# Patient Record
(demographics unavailable — no encounter records)

---

## 2024-10-11 NOTE — HISTORY OF PRESENT ILLNESS
[Sudden] : sudden [1] : 2 [10] : 10 [] : Post Surgical Visit: yes [de-identified] : dos: 07.15.24 10/11/24: Here for pov s/p left knee acl, mmr, plm. Attending PT. Doing well.  8/27/24: Here for f/u pov4 s/p left knee ACL reconstruction, medial meniscal repair and partial lateral meniscectomy. Doing well. Attending PT.  8/9/24: Pt here for f/u  pov3 s/p left knee ACL reconstruction, medial meniscal repair and partial lateral meniscectomy. Doing well. attending PT, compliant with brace. 07/26/2024: Pt here for pov2 s/p left knee ACL reconstruction, medial meniscal repair and partial lateral meniscectomy. Doing well. Complaint with brace.  07/16/2024: Pt here for pov1 s/p left knee ACL reconstruction, medial meniscal repair and partial lateral meniscectomy. Doing well. Denies f/c/s. Complaint with Brace. Ambulating with crutches.   7/11/24: Here to f/up left knee and review MRI results.  7/10/24: Mr ELLIOT PATEL is a 23 y/o male here today for L knee pain. States last night he was playing volleyball, he jumped and when he landed, he heard a pop and his knee buckled/shifted and had sudden onset of pain. States he has full ROM and not too much pain now. Presents with edema, and points to pain poster lateal knee, and medial knee. States he tried to walk this morning but felt like his knee was going to give out. He presents in the office today NWB in a wheelchair and has a compression sock on. No tx or images have been done. Occ: [employer] [FreeTextEntry3] : 7/9/24 [FreeTextEntry4] : 7pm [FreeTextEntry5] : PO#1- LT knee ACL recon. with meniscal repair, DOS 7/15/24. Continuing PT- going well, noticing improvement. No longer using brace for one week, wearing compression sleeve to help with swelling.  [de-identified] : 7/15/24 [de-identified] : 7/15/24 [de-identified] : LT knee ACL recon. with meniscal repair

## 2024-10-11 NOTE — PHYSICAL EXAM
[Left] : left knee [NL (0)] : extension 0 degrees [NL (140)] : flexion 140 degrees [] : no pain with varus stress

## 2024-10-11 NOTE — REASON FOR VISIT
[FreeTextEntry2] : LT knee ACL recon. with medial meniscal repair and partial lateral meniscectomy, DOS 7/15/24, patient is feeling better since last visit and is doing PT 2x a week

## 2024-10-11 NOTE — DISCUSSION/SUMMARY
[de-identified] : 24m s/p left knee acl recon, mmr and plm 07.15.24 1) ACL functional Brace was appropriately fitted and dispensed. Pt left the office with brace as stated above.   2) reviewed post-op precautions and procedures. 3) continue with physical therapy 4) cryotherapy, rest and activity modification  5) rtc 3 months   Entered by Kezia Agrawal acting as scribe. Dr. Parrish- The documentation recorded by the scribe accurately reflects the service I personally performed and the decisions made by me.

## 2024-12-09 NOTE — PHYSICAL EXAM
[Left] : left knee [NL (140)] : flexion 140 degrees [NL (0)] : extension 0 degrees [Negative] : negative Cathy's [] : non-antalgic

## 2024-12-09 NOTE — HISTORY OF PRESENT ILLNESS
[Sudden] : sudden [1] : 2 [10] : 10 [] : Post Surgical Visit: yes [de-identified] : dos: 07.15.24  12/9/24: Pt here to f/u L knee ACLR. States he is attending PT 2x/week, and just started some light jogging and jumping. mild pain / stiffness after increased activity.  10/11/24: Here for pov s/p left knee acl, mmr, plm. Attending PT. Doing well.  8/27/24: Here for f/u pov4 s/p left knee ACL reconstruction, medial meniscal repair and partial lateral meniscectomy. Doing well. Attending PT.  8/9/24: Pt here for f/u  pov3 s/p left knee ACL reconstruction, medial meniscal repair and partial lateral meniscectomy. Doing well. attending PT, compliant with brace. 07/26/2024: Pt here for pov2 s/p left knee ACL reconstruction, medial meniscal repair and partial lateral meniscectomy. Doing well. Complaint with brace.  07/16/2024: Pt here for pov1 s/p left knee ACL reconstruction, medial meniscal repair and partial lateral meniscectomy. Doing well. Denies f/c/s. Complaint with Brace. Ambulating with crutches.   7/11/24: Here to f/up left knee and review MRI results.  7/10/24: Mr ELLIOT PATEL is a 23 y/o male here today for L knee pain. States last night he was playing volleyball, he jumped and when he landed, he heard a pop and his knee buckled/shifted and had sudden onset of pain. States he has full ROM and not too much pain now. Presents with edema, and points to pain poster lateal knee, and medial knee. States he tried to walk this morning but felt like his knee was going to give out. He presents in the office today NWB in a wheelchair and has a compression sock on. No tx or images have been done. Occ: [employer] [FreeTextEntry3] : 7/9/24 [FreeTextEntry4] : 7pm [FreeTextEntry5] : PO#1- LT knee ACL recon. with meniscal repair, DOS 7/15/24. Continuing PT- going well, noticing improvement. No longer using brace for one week, wearing compression sleeve to help with swelling.  [de-identified] : 7/15/24 [de-identified] : 7/15/24 [de-identified] : LT knee ACL recon. with meniscal repair

## 2024-12-09 NOTE — DISCUSSION/SUMMARY
[de-identified] : 24m s/p left knee acl recon, mmr and plm 07.15.24 1) c/w ACL functional Brace for increased activities 2) reviewed post-op precautions and procedures. 3) continue with physical therapy 4) cryotherapy, rest and activity modification  5) rtc 3 months   Entered by Kezia Agrawal acting as scribe. Dr. Parrish- The documentation recorded by the scribe accurately reflects the service I personally performed and the decisions made by me.

## 2025-02-18 NOTE — HISTORY OF PRESENT ILLNESS
[Sudden] : sudden [1] : 2 [10] : 10 [de-identified] : dos: 07.15.24  2/18/25: Here to f/u L knee. Doing well. Completed PT and transitioned to exercises on his own at the gym.  12/9/24: Pt here to f/u L knee ACLR. States he is attending PT 2x/week and just started some light jogging and jumping. mild pain / stiffness after increased activity.  10/11/24: Here for pov s/p left knee acl, mmr, plm. Attending PT. Doing well.  8/27/24: Here for f/u pov4 s/p left knee ACL reconstruction, medial meniscal repair and partial lateral meniscectomy. Doing well. Attending PT.  8/9/24: Pt here for f/u  pov3 s/p left knee ACL reconstruction, medial meniscal repair and partial lateral meniscectomy. Doing well. attending PT, compliant with brace. 07/26/2024: Pt here for pov2 s/p left knee ACL reconstruction, medial meniscal repair and partial lateral meniscectomy. Doing well. Complaint with brace.  07/16/2024: Pt here for pov1 s/p left knee ACL reconstruction, medial meniscal repair and partial lateral meniscectomy. Doing well. Denies f/c/s. Complaint with Brace. Ambulating with crutches.   7/11/24: Here to f/up left knee and review MRI results.  7/10/24: Mr ELLIOT PATEL is a 25 y/o male here today for L knee pain. States last night he was playing volleyball, he jumped and when he landed, he heard a pop and his knee buckled/shifted and had sudden onset of pain. States he has full ROM and not too much pain now. Presents with edema, and points to pain poster lateal knee, and medial knee. States he tried to walk this morning but felt like his knee was going to give out. He presents in the office today NWB in a wheelchair and has a compression sock on. No tx or images have been done. Occ: [employer] [] : Post Surgical Visit: no [FreeTextEntry3] : 7/9/24 [FreeTextEntry4] : 7pm [FreeTextEntry5] : PO#1- LT knee ACL recon. with meniscal repair, DOS 7/15/24. Continuing PT- going well, noticing improvement. No longer using brace for one week, wearing compression sleeve to help with swelling.  [de-identified] : 7/15/24 [de-identified] : 7/15/24 [de-identified] : LT knee ACL recon. with meniscal repair

## 2025-02-18 NOTE — PHYSICAL EXAM
[Left] : left knee [NL (140)] : flexion 140 degrees [NL (0)] : extension 0 degrees [Negative] : negative Cathy's [5___] : hamstring 5[unfilled]/5 [] : non-antalgic

## 2025-02-18 NOTE — DISCUSSION/SUMMARY
[de-identified] : 24m s/p left knee acl recon, mmr and plm 07.15.24 1) c/w ACL functional Brace for increased activities 2) reviewed post-op precautions and procedures, all questions answered.  3) continue with home exercises and continue with strengthening  4) discussed gradual return to and increase in activity as tolerated 5) rtc at 1 year post-op  Entered by Kezia Agrawal acting as scribe. Dr. Parrish- The documentation recorded by the scribe accurately reflects the service I personally performed and the decisions made by me.